# Patient Record
Sex: MALE | Race: BLACK OR AFRICAN AMERICAN
[De-identification: names, ages, dates, MRNs, and addresses within clinical notes are randomized per-mention and may not be internally consistent; named-entity substitution may affect disease eponyms.]

---

## 2017-11-26 NOTE — ER DOCUMENT REPORT
ED General





- General


Chief Complaint: Insect Bite


Stated Complaint: INSECT BITE ON LEFT LEG


Time Seen by Provider: 11/26/17 23:00


Mode of Arrival: Ambulatory


Information source: Patient


Notes: 





27-year-old male presents with 1 day duration of calf swelling with drainage.  

Patient notes it is tender to palpation is unsure if a spider bit him.  He 

denies any fevers or chills denies any nausea vomiting or diarrhea





- HPI


Onset: Yesterday


Onset/Duration: Sudden


Quality of pain: Achy


Severity: Mild


Pain Level: 1


Associated symptoms: Other


Exacerbated by: Denies


Relieved by: Denies


Similar symptoms previously: No


Recently seen / treated by doctor: No





- Related Data


Allergies/Adverse Reactions: 


 





Ants Allergy (Uncoded 12/08/13 07:07)


 


Dairy Allergy (Uncoded 12/08/13 07:07)


 











Past Medical History





- Social History


Smoking Status: Current Every Day Smoker


Cigarette use (# per day): Yes


Chew tobacco use (# tins/day): No


Smoking Education Provided: No


Family History: Reviewed & Not Pertinent


Patient has suicidal ideation: No


Patient has homicidal ideation: No


Renal/ Medical History: Denies: Hx Peritoneal Dialysis


Past Surgical History: Reports: Hx Orthopedic Surgery - jaw





- Immunizations


Hx Diphtheria, Pertussis, Tetanus Vaccination: Yes





Review of Systems





- Review of Systems


Notes: 





REVIEW OF SYSTEMS:


CONSTITUTIONAL :  Denies fever,  chills, or sweats.  Denies recent illness.


EENT:   Denies eye, ear, throat, or mouth pain or symptoms.  Denies nasal or 

sinus congestion or discharge.  Denies throat, tongue, or mouth swelling or 

difficulty swallowing.


CARDIOVASCULAR:  Denies chest pain.  Denies palpitations or racing or irregular 

heart beat.  Denies ankle edema.


RESPIRATORY:  Denies cough, cold, or chest congestion.  Denies shortness of 

breath, difficulty breathing, or wheezing.


GASTROINTESTINAL:  Denies abdominal pain or distention.  Denies nausea, vomiting

, or diarrhea.  Denies blood in vomitus, stools, or per rectum.  Denies black, 

tarry stools.  Denies constipation.  


GENITOURINARY:  Denies difficulty urinating, painful urination, burning, 

frequency, blood in urine, or discharge.


MUSCULOSKELETAL:  Denies back or neck pain or stiffness.  Denies joint pain or 

swelling.


SKIN:   Admits to abscess on calf


HEMATOLOGIC :   Denies easy bruising or bleeding.


LYMPHATIC:  Denies swollen, enlarged glands.


NEUROLOGICAL:  Denies confusion or altered mental status.  Denies passing out 

or loss of consciousness.  Denies dizziness or lightheadedness.  Denies 

headache.  Denies weakness or paralysis or loss of use of either side.  Denies 

problems with gait or speech.  Denies sensory loss, numbness, or tingling.  

Denies seizures.


PSYCHIATRIC:  Denies anxiety or stress.  Denies depression, suicidal ideation, 

or homicidal ideation.





ALL OTHER SYSTEMS REVIEWED AND NEGATIVE.





Dictation was performed using Dragon voice recognition software 





PHYSICAL EXAMINATION:





GENERAL: Well-appearing, well-nourished and in no acute distress.





HEAD: Atraumatic, normocephalic.





EYES: Pupils equal round and reactive to light, extraocular movements intact, 

sclera anicteric, conjunctiva are normal.





ENT: Nares patent, oropharynx clear without exudates.  Moist mucous membranes.





NECK: Normal range of motion, supple without lymphadenopathy





LUNGS: Breath sounds clear to auscultation bilaterally and equal.  No wheezes 

rales or rhonchi.





HEART: Regular rate and rhythm without murmurs





ABDOMEN: Soft, nontender, nondistended abdomen.  No guarding, no rebound.  No 

masses appreciated.





Musculoskeletal: Normal range of motion, no pitting or edema.  No cyanosis.





NEUROLOGICAL: Cranial nerves grossly intact.  Normal speech, normal gait.  

Normal sensory, motor exams 





PSYCH: Normal mood, normal affect.





SKIN: 2 x 3 cm abscess on the left calf with minimal drainage pus





Physical Exam





- Vital signs


Vitals: 


 











Temp Pulse Resp BP Pulse Ox


 


 98.4 F   79   16   115/59 L  100 


 


 11/26/17 21:25  11/26/17 21:25  11/26/17 21:25  11/26/17 21:25  11/26/17 21:25














Course





- Re-evaluation


Re-evalutation: 





11/26/17 23:50


Area was anesthetized incised large amount of pus was drained, patient will be 

placed on antibiotics otherwise well-appearing, very strict return precautions 

have been provided as well as care for the abscess








After performing a Medical Screening Examination, I estimate there is LOW risk 

for OPEN FRACTURE, COMPARTMENT SYNDROME, TENDON RUPTURE, ACUTE NEUROVASCULAR 

INJURY, or RETAINED FOREIGN BODY, thus I consider the discharge disposition 

reasonable. Also, there is no evidence or peritonitis, sepsis, or toxicity.  I 

have reevaluated this patient multiple times and no significant life 

threatening changes are noted. The patient and I have discussed the diagnosis 

and risks, and we agree with discharging home with close follow-up with the 

understanding that symptoms and presentations can change. We also discussed 

returning to the Emergency Department immediately if new or worsening symptoms 

occur. We have discussed the symptoms which are most concerning (e.g., changing 

or worsening pain, fever, numbness, weakness, cool or painful digits) that 

necessitate immediate return.





- Vital Signs


Vital signs: 


 











Temp Pulse Resp BP Pulse Ox


 


 98.4 F   79   16   115/59 L  100 


 


 11/26/17 21:25  11/26/17 21:25  11/26/17 21:25  11/26/17 21:25  11/26/17 21:25














Procedures





- Incision and Drainage


  ** Left Leg


Time completed: 11:45


Type: Simple


Anesthetic type: 1% Lidocaine


mL's of anesthetic: 5


Blade size: 11


I&D procedure: Sterile dressing applied


Incision Method: Incision made by scalpel


Amount/type of drainage: Large amount of pus





Discharge





- Discharge


Clinical Impression: 


 Abscess of calf





Condition: Stable


Disposition: HOME, SELF-CARE


Instructions:  Abscess ([facility]), Post Incision and Drainage


Additional Instructions: 


Follow up with your physician tomorrow for further care or return to the ED 

IMMEDIATELY if symptoms worsen or new concerns occur. If you cannot afford to 

follow up with your primary care physician a list of low cost clinics have been 

provided at the end of your discharge papers as well.


Prescriptions: 


Cephalexin Monohydrate [Keflex 500 mg Capsule] 500 mg PO QID #40 capsule


Hydrocodone/Acetaminophen [Norco 5-325 mg Tablet] 1 tab PO Q6 #10 tablet


Sulfamethoxazole/Trimethoprim [Bactrim Ds Tablet] 2 each PO BID #40 tablet

## 2019-04-05 ENCOUNTER — HOSPITAL ENCOUNTER (EMERGENCY)
Dept: HOSPITAL 62 - ER | Age: 29
Discharge: LEFT BEFORE DISCHARGE | End: 2019-04-05
Payer: SELF-PAY

## 2019-04-05 VITALS — DIASTOLIC BLOOD PRESSURE: 62 MMHG | SYSTOLIC BLOOD PRESSURE: 121 MMHG

## 2019-04-05 DIAGNOSIS — R11.0: ICD-10-CM

## 2019-04-05 DIAGNOSIS — Z91.018: ICD-10-CM

## 2019-04-05 DIAGNOSIS — Z53.20: ICD-10-CM

## 2019-04-05 DIAGNOSIS — Z91.038: ICD-10-CM

## 2019-04-05 DIAGNOSIS — R42: Primary | ICD-10-CM

## 2019-04-05 LAB
ADD MANUAL DIFF: NO
ALBUMIN SERPL-MCNC: 4.6 G/DL (ref 3.5–5)
ALP SERPL-CCNC: 65 U/L (ref 38–126)
ALT SERPL-CCNC: 117 U/L (ref 21–72)
ANION GAP SERPL CALC-SCNC: 9 MMOL/L (ref 5–19)
APPEARANCE UR: CLEAR
APTT PPP: YELLOW S
AST SERPL-CCNC: 74 U/L (ref 17–59)
BASOPHILS # BLD AUTO: 0.1 10^3/UL (ref 0–0.2)
BASOPHILS NFR BLD AUTO: 1 % (ref 0–2)
BILIRUB DIRECT SERPL-MCNC: 0.3 MG/DL (ref 0–0.4)
BILIRUB SERPL-MCNC: 0.8 MG/DL (ref 0.2–1.3)
BILIRUB UR QL STRIP: NEGATIVE
BUN SERPL-MCNC: 9 MG/DL (ref 7–20)
CALCIUM: 9.9 MG/DL (ref 8.4–10.2)
CHLORIDE SERPL-SCNC: 100 MMOL/L (ref 98–107)
CO2 SERPL-SCNC: 29 MMOL/L (ref 22–30)
EOSINOPHIL # BLD AUTO: 0.3 10^3/UL (ref 0–0.6)
EOSINOPHIL NFR BLD AUTO: 4 % (ref 0–6)
ERYTHROCYTE [DISTWIDTH] IN BLOOD BY AUTOMATED COUNT: 12.6 % (ref 11.5–14)
GLUCOSE SERPL-MCNC: 75 MG/DL (ref 75–110)
GLUCOSE UR STRIP-MCNC: NEGATIVE MG/DL
HCT VFR BLD CALC: 39.2 % (ref 37.9–51)
HGB BLD-MCNC: 13.5 G/DL (ref 13.5–17)
KETONES UR STRIP-MCNC: NEGATIVE MG/DL
LYMPHOCYTES # BLD AUTO: 2.5 10^3/UL (ref 0.5–4.7)
LYMPHOCYTES NFR BLD AUTO: 38.2 % (ref 13–45)
MCH RBC QN AUTO: 32.9 PG (ref 27–33.4)
MCHC RBC AUTO-ENTMCNC: 34.4 G/DL (ref 32–36)
MCV RBC AUTO: 96 FL (ref 80–97)
MONOCYTES # BLD AUTO: 0.4 10^3/UL (ref 0.1–1.4)
MONOCYTES NFR BLD AUTO: 6 % (ref 3–13)
NEUTROPHILS # BLD AUTO: 3.3 10^3/UL (ref 1.7–8.2)
NEUTS SEG NFR BLD AUTO: 50.8 % (ref 42–78)
NITRITE UR QL STRIP: NEGATIVE
PH UR STRIP: 6 [PH] (ref 5–9)
PLATELET # BLD: 212 10^3/UL (ref 150–450)
POTASSIUM SERPL-SCNC: 4.4 MMOL/L (ref 3.6–5)
PROT SERPL-MCNC: 8.2 G/DL (ref 6.3–8.2)
PROT UR STRIP-MCNC: NEGATIVE MG/DL
RBC # BLD AUTO: 4.09 10^6/UL (ref 4.35–5.55)
SODIUM SERPL-SCNC: 137.9 MMOL/L (ref 137–145)
SP GR UR STRIP: 1.01
TOTAL CELLS COUNTED % (AUTO): 100 %
UROBILINOGEN UR-MCNC: NEGATIVE MG/DL (ref ?–2)
WBC # BLD AUTO: 6.5 10^3/UL (ref 4–10.5)

## 2019-04-05 PROCEDURE — 99281 EMR DPT VST MAYX REQ PHY/QHP: CPT

## 2019-04-05 PROCEDURE — S0119 ONDANSETRON 4 MG: HCPCS

## 2019-04-05 PROCEDURE — 36415 COLL VENOUS BLD VENIPUNCTURE: CPT

## 2019-04-05 PROCEDURE — 81001 URINALYSIS AUTO W/SCOPE: CPT

## 2019-04-05 PROCEDURE — 85025 COMPLETE CBC W/AUTO DIFF WBC: CPT

## 2019-04-05 PROCEDURE — 80053 COMPREHEN METABOLIC PANEL: CPT

## 2019-04-05 NOTE — ER DOCUMENT REPORT
ED Medical Screen (RME)





- General


Chief Complaint: Dizziness


Stated Complaint: WEAKNESS


Time Seen by Provider: 04/05/19 19:53


Mode of Arrival: Ambulatory


Information source: Patient


Notes: 





Patient is a 28-year-old male who presents the emergency department chief 

complaint of dizziness and nausea.  Patient also reports abdominal pain that is 

now resolved.  Patient states that when he got here he felt like he may pass 

out.  Patient denies any chronic medical issues, states he has usually healthy 

and does not take any daily medications.  Patient does report that he goes to 

the gym daily and states that he is drinks plenty of water.  Patient has not 

vomited has had no episodes of diarrhea.





Exam:


Lung sounds clear to auscultation bilaterally, abdomen soft and nontender.





I have greeted and performed a rapid initial assessment of this patient.  A 

comprehensive ED assessment and evaluation of the patient, analysis of test 

results and completion of the medical decision making process will be conducted 

by additional ED providers.  Dictation of this chart was performed using voice 

recognition software; therefore, there may be some unintended grammatical 

errors.


TRAVEL OUTSIDE OF THE [place] IN LAST 30 DAYS: No





- Related Data


Allergies/Adverse Reactions: 


                                        





Ants Allergy (Uncoded 12/08/13 07:07)


   


Dairy Allergy (Uncoded 12/08/13 07:07)


   











Past Medical History


Renal/ Medical History: Denies: Hx Peritoneal Dialysis


Past Surgical History: Reports: Hx Orthopedic Surgery - jaw





- Immunizations


Hx Diphtheria, Pertussis, Tetanus Vaccination: Yes





Physical Exam





- Vital signs


Vitals: 





                                        











Temp Pulse Resp BP Pulse Ox


 


 98.5 F   51 L  17   121/62   100 


 


 04/05/19 19:29  04/05/19 19:29  04/05/19 19:29  04/05/19 19:29  04/05/19 19:29














Course





- Vital Signs


Vital signs: 





                                        











Temp Pulse Resp BP Pulse Ox


 


 98.5 F   51 L  17   121/62   100 


 


 04/05/19 19:29  04/05/19 19:29  04/05/19 19:29  04/05/19 19:29  04/05/19 19:29